# Patient Record
Sex: MALE | Race: WHITE | NOT HISPANIC OR LATINO
[De-identification: names, ages, dates, MRNs, and addresses within clinical notes are randomized per-mention and may not be internally consistent; named-entity substitution may affect disease eponyms.]

---

## 2017-11-28 ENCOUNTER — FORM ENCOUNTER (OUTPATIENT)
Age: 57
End: 2017-11-28

## 2017-11-29 ENCOUNTER — OUTPATIENT (OUTPATIENT)
Dept: OUTPATIENT SERVICES | Facility: HOSPITAL | Age: 57
LOS: 1 days | End: 2017-11-29
Payer: COMMERCIAL

## 2017-11-29 DIAGNOSIS — Z90.89 ACQUIRED ABSENCE OF OTHER ORGANS: Chronic | ICD-10-CM

## 2017-11-29 DIAGNOSIS — J33.9 NASAL POLYP, UNSPECIFIED: Chronic | ICD-10-CM

## 2017-11-29 DIAGNOSIS — S83.209A UNSPECIFIED TEAR OF UNSPECIFIED MENISCUS, CURRENT INJURY, UNSPECIFIED KNEE, INITIAL ENCOUNTER: Chronic | ICD-10-CM

## 2017-11-29 DIAGNOSIS — Z98.89 OTHER SPECIFIED POSTPROCEDURAL STATES: Chronic | ICD-10-CM

## 2017-11-29 PROCEDURE — 70543 MRI ORBT/FAC/NCK W/O &W/DYE: CPT

## 2017-11-29 PROCEDURE — A9585: CPT

## 2017-11-29 PROCEDURE — 70543 MRI ORBT/FAC/NCK W/O &W/DYE: CPT | Mod: 26

## 2017-12-11 ENCOUNTER — APPOINTMENT (OUTPATIENT)
Dept: OTOLARYNGOLOGY | Facility: CLINIC | Age: 57
End: 2017-12-11
Payer: COMMERCIAL

## 2017-12-11 DIAGNOSIS — K21.9 GASTRO-ESOPHAGEAL REFLUX DISEASE W/OUT ESOPHAGITIS: ICD-10-CM

## 2017-12-11 DIAGNOSIS — J34.89 OTHER SPECIFIED DISORDERS OF NOSE AND NASAL SINUSES: ICD-10-CM

## 2017-12-11 PROCEDURE — 99214 OFFICE O/P EST MOD 30 MIN: CPT | Mod: 25

## 2017-12-11 PROCEDURE — 31575 DIAGNOSTIC LARYNGOSCOPY: CPT

## 2017-12-11 RX ORDER — OMEPRAZOLE 20 MG/1
20 TABLET, DELAYED RELEASE ORAL
Qty: 60 | Refills: 3 | Status: ACTIVE | COMMUNITY
Start: 2017-12-11 | End: 1900-01-01

## 2017-12-12 PROBLEM — K21.9 EROSIVE GASTROESOPHAGEAL REFLUX DISEASE: Status: ACTIVE | Noted: 2017-12-11

## 2018-05-13 ENCOUNTER — FORM ENCOUNTER (OUTPATIENT)
Age: 58
End: 2018-05-13

## 2018-05-14 ENCOUNTER — OUTPATIENT (OUTPATIENT)
Dept: OUTPATIENT SERVICES | Facility: HOSPITAL | Age: 58
LOS: 1 days | End: 2018-05-14
Payer: COMMERCIAL

## 2018-05-14 ENCOUNTER — APPOINTMENT (OUTPATIENT)
Dept: MRI IMAGING | Facility: HOSPITAL | Age: 58
End: 2018-05-14

## 2018-05-14 DIAGNOSIS — Z90.89 ACQUIRED ABSENCE OF OTHER ORGANS: Chronic | ICD-10-CM

## 2018-05-14 DIAGNOSIS — J33.9 NASAL POLYP, UNSPECIFIED: Chronic | ICD-10-CM

## 2018-05-14 DIAGNOSIS — Z98.89 OTHER SPECIFIED POSTPROCEDURAL STATES: Chronic | ICD-10-CM

## 2018-05-14 DIAGNOSIS — S83.209A UNSPECIFIED TEAR OF UNSPECIFIED MENISCUS, CURRENT INJURY, UNSPECIFIED KNEE, INITIAL ENCOUNTER: Chronic | ICD-10-CM

## 2018-05-14 PROCEDURE — 70543 MRI ORBT/FAC/NCK W/O &W/DYE: CPT

## 2018-05-14 PROCEDURE — 70543 MRI ORBT/FAC/NCK W/O &W/DYE: CPT | Mod: 26

## 2018-05-14 PROCEDURE — A9585: CPT

## 2019-02-18 ENCOUNTER — FORM ENCOUNTER (OUTPATIENT)
Age: 59
End: 2019-02-18

## 2019-02-19 ENCOUNTER — APPOINTMENT (OUTPATIENT)
Dept: MRI IMAGING | Facility: HOSPITAL | Age: 59
End: 2019-02-19
Payer: COMMERCIAL

## 2019-02-19 ENCOUNTER — OUTPATIENT (OUTPATIENT)
Dept: OUTPATIENT SERVICES | Facility: HOSPITAL | Age: 59
LOS: 1 days | End: 2019-02-19
Payer: COMMERCIAL

## 2019-02-19 DIAGNOSIS — Z90.89 ACQUIRED ABSENCE OF OTHER ORGANS: Chronic | ICD-10-CM

## 2019-02-19 DIAGNOSIS — Z98.89 OTHER SPECIFIED POSTPROCEDURAL STATES: Chronic | ICD-10-CM

## 2019-02-19 DIAGNOSIS — S83.209A UNSPECIFIED TEAR OF UNSPECIFIED MENISCUS, CURRENT INJURY, UNSPECIFIED KNEE, INITIAL ENCOUNTER: Chronic | ICD-10-CM

## 2019-02-19 DIAGNOSIS — J33.9 NASAL POLYP, UNSPECIFIED: Chronic | ICD-10-CM

## 2019-02-19 PROCEDURE — 70543 MRI ORBT/FAC/NCK W/O &W/DYE: CPT | Mod: 26

## 2019-02-19 PROCEDURE — 70543 MRI ORBT/FAC/NCK W/O &W/DYE: CPT

## 2019-02-19 PROCEDURE — A9585: CPT

## 2019-02-25 ENCOUNTER — APPOINTMENT (OUTPATIENT)
Dept: OTOLARYNGOLOGY | Facility: CLINIC | Age: 59
End: 2019-02-25
Payer: COMMERCIAL

## 2019-02-25 VITALS
BODY MASS INDEX: 31.83 KG/M2 | HEART RATE: 66 BPM | DIASTOLIC BLOOD PRESSURE: 76 MMHG | HEIGHT: 68 IN | WEIGHT: 210 LBS | TEMPERATURE: 98.5 F | SYSTOLIC BLOOD PRESSURE: 118 MMHG

## 2019-02-25 DIAGNOSIS — D36.9 BENIGN NEOPLASM, UNSPECIFIED SITE: ICD-10-CM

## 2019-02-25 PROCEDURE — 99214 OFFICE O/P EST MOD 30 MIN: CPT | Mod: 25

## 2019-02-25 PROCEDURE — 31231 NASAL ENDOSCOPY DX: CPT

## 2019-02-27 NOTE — HISTORY OF PRESENT ILLNESS
[de-identified] : 55M w/ hx Jorge Andersen for inverted papilloma (1986, 87), presents with recurrence. Did very well post-operatively. In summer 2015, developed congestion, HAs, sinus pain. Underwent FESS and debridement of polyposis with Dr. Parker in 9/15, pathology reportedly showed recurrent inverted papilloma. The patient was scoped a month later, during which Dr. Parker was concerned for early recurrence. \par \par Patient had endoscopic resection for IP resection and CSF leak repair 06/07/2016. Had recent MRI 11/29/2017 with stable appearance of post surgical L sinonasal cavity, non specific enhancing tissue at superior ethmoidectomy bed. \par \par Here today for follow up, has not been seen in a while as had two shoulder surgeries which he was recovering from. Current symptoms are PND present since last visit, and irritating chemical taste in the back of his throat which has been persistent for several months and pt feels is moderate to severe, recently started Zantac two days ago to help with this symptom but had not had any relief yet. Denies any congestion, rhinorrhea. 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 \par \par  [FreeTextEntry1] : Patient returns for annual MRI review. Patient is doing well without any concerns. Patient has a question whether it is allowed to have a root canal procedure. Patient denies epistaxis, nasal congestion, facial pressure/pain, post nasal drip, compromised sense of smell, purulent discharge, or fever.\par \par

## 2019-02-27 NOTE — PROCEDURE
[Red] : red [Nasal Mucosa Crusts / Sores] : no lesions [Nasal Mucosa] : no polyps [Nasal Septum] : no lesions [Nasal Septum Mucosa Bleeding] : no bleeding [Paranasal Sinuses Middle Meatus] : no polyps [Paranasal Sinuses Maxillary Sinus] : no maxillary purulence [Paranasal Sinuses Ethmoid Sinus] : no ethmoid purulence [Paranasal Sinuses Sphenoid Sinus] : no spenoid purulence [] : bilateral patent antrostomies [Complicated Symptoms] : complicated symptoms requiring more thorough examination than provided by mirror [Topical Lidocaine] : topical lidocaine [Oxymetazoline HCl] : oxymetazoline HCl [Flexible Endoscope] : examined with the flexible endoscope [Normal] : normal [Epiglottis] : smooth and round bilaterally [True Vocal Cords Paralysis] : no true vocal cord paralysis [True Vocal Cords Erythematous] : no true vocal cord edema [True Vocal Cords Youngblood's Nodules] : no true vocal cord nodules [Glottis Arytenoid Cartilages Erythema] : bilateral arytenoid ~M erythema [FreeTextEntry6] : Nasal Endoscopy: Verbal consent was obtained prior to the procedure. Pt was sprayed with phenylephrine/lidocaine. A zero-degree rigid nasal endoscopy was used to examine nasal cavities and nasopharynx bilaterally. \par \par Right nasal cavity:  no masses, lesions, purulence noted\par Left nasal cavity:  no masses, lesions, purulence noted\par \par Pt tolerated procedure well with no complications.\par  [Present] : absent [de-identified] : throat pain/chemical taste in mouth [FreeTextEntry4] : erythematous [de-identified] : prominent lingual tonsils symettric b/l [FreeTextEntry2] : erythematous [de-identified] : erythematous [de-identified] : erythematous, no lesions noted

## 2019-02-27 NOTE — PHYSICAL EXAM
[Nasal Endoscopy Performed] : nasal endoscopy was performed, see procedure section for findings [Midline] : trachea located in midline position [Normal] : orientation to person, place, and time: normal [de-identified] : b/l radial pulses palpable and 2+ [de-identified] : exposed skin of head and neck without rashes/lesions.

## 2019-02-27 NOTE — ASSESSMENT
[FreeTextEntry1] : 57 M with hx of recurrent inverted papilloma s/p resection by Dr. Ochoa 6/16. MRI with no evidence of recurrence. 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 Scope exam with evidence of erythema/irritation in b/l nasal cavities and erythema/edema of epiglottics and glottis. \par \par Rigid nasal endoscopy shows no mass/lesions\par \par - MRI review with patient - stable exam\par - Continue using nasal saline spray and steroid spray as needed\par - Repeat MRI in 1 year\par - RTC in 6 months for routine follow up\par - Advised patient to call the office or go to ED if symptoms get worse\par - Patient verbalized understanding of plans above\par \par

## 2023-07-11 ENCOUNTER — RX ONLY (RX ONLY)
Age: 63
End: 2023-07-11

## 2023-07-11 ENCOUNTER — OFFICE (OUTPATIENT)
Dept: URBAN - METROPOLITAN AREA CLINIC 114 | Facility: CLINIC | Age: 63
Setting detail: OPHTHALMOLOGY
End: 2023-07-11
Payer: COMMERCIAL

## 2023-07-11 DIAGNOSIS — H40.033: ICD-10-CM

## 2023-07-11 DIAGNOSIS — H11.32: ICD-10-CM

## 2023-07-11 DIAGNOSIS — H16.221: ICD-10-CM

## 2023-07-11 DIAGNOSIS — H16.222: ICD-10-CM

## 2023-07-11 DIAGNOSIS — H16.223: ICD-10-CM

## 2023-07-11 PROCEDURE — 83861 MICROFLUID ANALY TEARS: CPT | Performed by: OPHTHALMOLOGY

## 2023-07-11 PROCEDURE — 92004 COMPRE OPH EXAM NEW PT 1/>: CPT | Performed by: OPHTHALMOLOGY

## 2023-07-11 PROCEDURE — 92250 FUNDUS PHOTOGRAPHY W/I&R: CPT | Performed by: OPHTHALMOLOGY

## 2023-07-11 PROCEDURE — 92020 GONIOSCOPY: CPT | Performed by: OPHTHALMOLOGY

## 2023-07-11 ASSESSMENT — REFRACTION_CURRENTRX
OS_ADD: +2.25
OS_CYLINDER: -1.25
OD_ADD: +2.25
OS_SPHERE: +6.25
OD_CYLINDER: -0.50
OS_AXIS: 109
OD_SPHERE: +5.25
OS_OVR_VA: 20/
OD_AXIS: 061
OS_VPRISM_DIRECTION: PROGS
OD_VPRISM_DIRECTION: PROGS
OD_OVR_VA: 20/

## 2023-07-11 ASSESSMENT — SUPERFICIAL PUNCTATE KERATITIS (SPK)
OD_SPK: T
OS_SPK: T

## 2023-07-11 ASSESSMENT — SPHEQUIV_DERIVED
OS_SPHEQUIV: 5.625
OD_SPHEQUIV: 5.125

## 2023-07-11 ASSESSMENT — KERATOMETRY
OD_K2POWER_DIOPTERS: 44.50
OS_K2POWER_DIOPTERS: 43.50
OS_AXISANGLE_DEGREES: 090
OD_AXISANGLE_DEGREES: 093
OD_K1POWER_DIOPTERS: 43.50
OS_K1POWER_DIOPTERS: 43.50

## 2023-07-11 ASSESSMENT — REFRACTION_AUTOREFRACTION
OD_SPHERE: +5.25
OS_AXIS: 087
OS_CYLINDER: -0.75
OS_SPHERE: +6.00
OD_AXIS: 068
OD_CYLINDER: -0.25

## 2023-07-11 ASSESSMENT — AXIALLENGTH_DERIVED
OD_AL: 21.596
OS_AL: 21.5867

## 2023-07-11 ASSESSMENT — CONFRONTATIONAL VISUAL FIELD TEST (CVF)
OS_FINDINGS: FULL
OD_FINDINGS: FULL

## 2023-07-11 ASSESSMENT — VISUAL ACUITY
OD_BCVA: 20/25
OS_BCVA: 20/20